# Patient Record
Sex: FEMALE | Race: WHITE | NOT HISPANIC OR LATINO | URBAN - METROPOLITAN AREA
[De-identification: names, ages, dates, MRNs, and addresses within clinical notes are randomized per-mention and may not be internally consistent; named-entity substitution may affect disease eponyms.]

---

## 2022-06-07 ENCOUNTER — APPOINTMENT (RX ONLY)
Dept: URBAN - METROPOLITAN AREA CLINIC 162 | Facility: CLINIC | Age: 57
Setting detail: DERMATOLOGY
End: 2022-06-07

## 2022-06-07 DIAGNOSIS — L57.0 ACTINIC KERATOSIS: ICD-10-CM

## 2022-06-07 DIAGNOSIS — D485 NEOPLASM OF UNCERTAIN BEHAVIOR OF SKIN: ICD-10-CM

## 2022-06-07 DIAGNOSIS — D22 MELANOCYTIC NEVI: ICD-10-CM

## 2022-06-07 DIAGNOSIS — Z71.89 OTHER SPECIFIED COUNSELING: ICD-10-CM

## 2022-06-07 DIAGNOSIS — L57.8 OTHER SKIN CHANGES DUE TO CHRONIC EXPOSURE TO NONIONIZING RADIATION: ICD-10-CM | Status: STABLE

## 2022-06-07 DIAGNOSIS — L81.4 OTHER MELANIN HYPERPIGMENTATION: ICD-10-CM | Status: STABLE

## 2022-06-07 PROBLEM — D22.9 MELANOCYTIC NEVI, UNSPECIFIED: Status: ACTIVE | Noted: 2022-06-07

## 2022-06-07 PROBLEM — D37.01 NEOPLASM OF UNCERTAIN BEHAVIOR OF LIP: Status: ACTIVE | Noted: 2022-06-07

## 2022-06-07 PROCEDURE — ? COUNSELING

## 2022-06-07 PROCEDURE — ? FULL BODY SKIN EXAM

## 2022-06-07 PROCEDURE — ? LIQUID NITROGEN

## 2022-06-07 PROCEDURE — ? SUNSCREEN TREATMENT REGIMEN

## 2022-06-07 PROCEDURE — 11312 SHAVE SKIN LESION 1.1-2.0 CM: CPT

## 2022-06-07 PROCEDURE — ? ADDITIONAL NOTES

## 2022-06-07 PROCEDURE — ? SHAVE REMOVAL

## 2022-06-07 PROCEDURE — ? SUNSCREEN RECOMMENDATIONS

## 2022-06-07 PROCEDURE — 99203 OFFICE O/P NEW LOW 30 MIN: CPT | Mod: 25

## 2022-06-07 PROCEDURE — 17000 DESTRUCT PREMALG LESION: CPT | Mod: 59

## 2022-06-07 ASSESSMENT — LOCATION DETAILED DESCRIPTION DERM
LOCATION DETAILED: LEFT SUPERIOR CENTRAL MALAR CHEEK
LOCATION DETAILED: LEFT INFERIOR VERMILION LIP

## 2022-06-07 ASSESSMENT — LOCATION SIMPLE DESCRIPTION DERM
LOCATION SIMPLE: LEFT CHEEK
LOCATION SIMPLE: LEFT LIP

## 2022-06-07 ASSESSMENT — LOCATION ZONE DERM
LOCATION ZONE: FACE
LOCATION ZONE: LIP

## 2022-06-07 NOTE — PROCEDURE: LIQUID NITROGEN
Post-Care Instructions: I reviewed with the patient in detail post-care instructions. Patient is to wear sunprotection, and avoid picking at any of the treated lesions. Pt may apply Vaseline to crusted or scabbing areas.
Consent: The patient's consent was obtained including but not limited to risks of crusting, scabbing, blistering, scarring, darker or lighter pigmentary change, recurrence, incomplete removal and infection.
Render Note In Bullet Format When Appropriate: No
Number Of Freeze-Thaw Cycles: 1 freeze-thaw cycle
Duration Of Freeze Thaw-Cycle (Seconds): 0
Show Aperture Variable?: Yes
Detail Level: Simple

## 2022-06-07 NOTE — PROCEDURE: SHAVE REMOVAL
Medical Necessity Information: It is in your best interest to select a reason for this procedure from the list below. All of these items fulfill various CMS LCD requirements except the new and changing color options.
Medical Necessity Clause: This procedure was medically necessary because the lesion that was treated was:
Lab: [id number]
Lab Facility: [id number]
Detail Level: Detailed
Was A Bandage Applied: Yes
Size Of Lesion In Cm (Required): 1.1
Size Of Margin In Cm (Margins Are Not Added To Billing Dimensions): 1.2
Biopsy Method: Dermablade
Anesthesia Type: 1% lidocaine with epinephrine
Hemostasis: Drysol
Wound Care: Petrolatum
Render Path Notes In Note?: No
Consent was obtained from the patient. The risks and benefits to therapy were discussed in detail. Specifically, the risks of infection, scarring, bleeding, prolonged wound healing, incomplete removal, allergy to anesthesia, nerve injury and recurrence were addressed. Prior to the procedure, the treatment site was clearly identified and confirmed by the patient. All components of Universal Protocol/PAUSE Rule completed.
Post-Care Instructions: I reviewed with the patient in detail post-care instructions. Patient is to keep the biopsy site dry overnight, and then apply bacitracin twice daily until healed. Patient may apply hydrogen peroxide soaks to remove any crusting.
Notification Instructions: Patient will be notified of pathology results. However, patient instructed to call the office if not contacted within 2 weeks.
Billing Type: Third-Party Bill

## 2023-12-15 NOTE — HPI: EVALUATION OF SKIN LESION(S)
Hpi Title: Evaluation of Skin Lesions
Patient is here with wife.    If any information or results need to be relayed from today's visit, the best way to contact the patient is via [phone] - Patient gives verbal permission to leave a detailed voicemail at the number provided.      
How Severe Are Your Spot(S)?: moderate
Have Your Spot(S) Been Treated In The Past?: has not been treated